# Patient Record
(demographics unavailable — no encounter records)

---

## 2024-10-23 NOTE — PHYSICAL EXAM
[Clear Rhinorrhea] : clear rhinorrhea [Rhonchi] : rhonchi [NL] : warm, clear [Tachypnea] : no tachypnea [Belly Breathing] : no belly breathing

## 2024-10-23 NOTE — HISTORY OF PRESENT ILLNESS
[de-identified] : CHILD POSITIVE FOR RSV, WAS SEEN IN ED 2 DAYS AGO FOR FEVER AND DIFFICULTY BREATHING, DISCHARGED AFTER SUCTIONING, HERE FOR REEVALUATION

## 2024-10-26 NOTE — PHYSICAL EXAM
[NL] : warm, clear [Playful] : playful [FreeTextEntry7] : FEW SCATTERED WHEEZES AND RHONCHI, GOOD AIR ENTRY, NO DISTRESS

## 2024-11-12 NOTE — PHYSICAL EXAM
[Alert] : alert [Acute Distress] : no acute distress [Normocephalic] : normocephalic [Flat Open Anterior [place]] : flat open anterior fontanelle [Red Reflex] : red reflex bilateral [PERRL] : PERRL [Normally Placed Ears] : normally placed ears [Auricles Well Formed] : auricles well formed [Clear Tympanic membranes] : clear tympanic membranes [Light reflex present] : light reflex present [Bony landmarks visible] : bony landmarks visible [Discharge] : no discharge [Nares Patent] : nares patent [Palate Intact] : palate intact [Uvula Midline] : uvula midline [Tooth Eruption] : no tooth eruption [Supple, full passive range of motion] : supple, full passive range of motion [Palpable Masses] : no palpable masses [Symmetric Chest Rise] : symmetric chest rise [Clear to Auscultation Bilaterally] : clear to auscultation bilaterally [Regular Rate and Rhythm] : regular rate and rhythm [S1, S2 present] : S1, S2 present [Murmurs] : no murmurs [+2 Femoral Pulses] : (+) 2 femoral pulses [Soft] : soft [Tender] : nontender [Distended] : nondistended [Bowel Sounds] : bowel sounds present [Hepatomegaly] : no hepatomegaly [Splenomegaly] : no splenomegaly [Normal External Genitalia] : normal external genitalia [Clitoromegaly] : no clitoromegaly [Normal Vaginal Introitus] : normal vaginal introitus [Patent] : patent [Normally Placed] : normally placed [No Abnormal Lymph Nodes Palpated] : no abnormal lymph nodes palpated [Curry-Ortolani] : negative Curry-Ortolani [Allis Sign] : negative Allis sign [Symmetric Buttocks Creases] : symmetric buttocks creases [Spinal Dimple] : no spinal dimple [Tuft of Hair] : no tuft of hair [Plantar Grasp] : plantar grasp reflex present [Cranial Nerves Grossly Intact] : cranial nerves grossly intact [Rash or Lesions] : no rash/lesions

## 2024-11-12 NOTE — HISTORY OF PRESENT ILLNESS
[Mother] : mother [Breast milk] : breast milk [Well-balanced] : well-balanced [Normal] : Normal [No] : No cigarette smoke exposure [Water heater temperature set at <120 degrees F] : Water heater temperature set at <120 degrees F [Rear facing car seat in back seat] : Rear facing car seat in back seat [Carbon Monoxide Detectors] : Carbon monoxide detectors at home [Smoke Detectors] : Smoke detectors at home. [de-identified] : HOME [NO] : No

## 2025-01-06 NOTE — HISTORY OF PRESENT ILLNESS
[de-identified] : COUGH, WHEEZING AND BUMPS ON FACE AFTER EATING PEANUT BUTTER, MOM IS CONCERNED ABOUT POSSIBLE ALLERGY

## 2025-01-06 NOTE — HISTORY OF PRESENT ILLNESS
[de-identified] : COUGH, WHEEZING AND BUMPS ON FACE AFTER EATING PEANUT BUTTER, MOM IS CONCERNED ABOUT POSSIBLE ALLERGY

## 2025-02-03 NOTE — HISTORY OF PRESENT ILLNESS
[de-identified] : COUGH AND RATTLING IN CHEST  [FreeTextEntry6] : runny nose afebrile barky cough at night, difficulty sleeping sib w/same sxs + post-tussive vomiting

## 2025-02-03 NOTE — PLAN
[TextEntry] : croup guidelines (cold outside air, humidifier, saline nebs, bathroom w/hot steam shower on) benadryl / honey prn ok to ff

## 2025-02-25 NOTE — PHYSICAL EXAM
[Alert] : alert [Normocephalic] : normocephalic [Flat Open Anterior [place]] : flat open anterior fontanelle [Red Reflex] : red reflex bilateral [PERRL] : PERRL [Normally Placed Ears] : normally placed ears [Auricles Well Formed] : auricles well formed [Clear Tympanic membranes] : clear tympanic membranes [Light reflex present] : light reflex present [Bony landmarks visible] : bony landmarks visible [Nares Patent] : nares patent [Palate Intact] : palate intact [Uvula Midline] : uvula midline [Supple, full passive range of motion] : supple, full passive range of motion [Symmetric Chest Rise] : symmetric chest rise [Clear to Auscultation Bilaterally] : clear to auscultation bilaterally [Regular Rate and Rhythm] : regular rate and rhythm [S1, S2 present] : S1, S2 present [+2 Femoral Pulses] : (+) 2 femoral pulses [Soft] : soft [Bowel Sounds] : bowel sounds present [Normal External Genitalia] : normal external genitalia [Normal Vaginal Introitus] : normal vaginal introitus [No Abnormal Lymph Nodes Palpated] : no abnormal lymph nodes palpated [Symmetric abduction and rotation of hips] : symmetric abduction and rotation of hips [Straight] : straight [Cranial Nerves Grossly Intact] : cranial nerves grossly intact [Rash or Lesions] : rash and/or lesion present [Acute Distress] : no acute distress [Excessive Tearing] : no excessive tearing [Discharge] : no discharge [Palpable Masses] : no palpable masses [Murmurs] : no murmurs [Tender] : nontender [Distended] : nondistended [Hepatomegaly] : no hepatomegaly [Splenomegaly] : no splenomegaly [Clitoromegaly] : no clitoromegaly [Allis Sign] : negative Allis sign [de-identified] : MONILIAL DIAPER DERMATITIS

## 2025-02-25 NOTE — DEVELOPMENTAL MILESTONES
[Yes] : Completed. [Normal Development] : Normal Development [None] : none [Uses basic gestures] : uses basic gestures [Says "Zeeshan" or "Mama"] : says "Zeeshan" or "Mama" nonspecifically [Sits well without support] : sits well without support [Transitions between sitting and lying] : transitions between sitting and lying [Balances on hands and knees] : balances on hands and knees [Crawls] : crawls [Picks up small objects with 3 fingers] : picks up small objects with 3 fingers and thumb [Releases objects intentionally] : releases objects intentionally [[place] objects together] : bangs objects together

## 2025-02-25 NOTE — HISTORY OF PRESENT ILLNESS
[Parents] : parents [Tap water] : Primary Fluoride Source: Tap water [No] : Not at  exposure [Carbon Monoxide Detectors] : Carbon monoxide detectors [Smoke Detectors] : Smoke detectors [de-identified] : home

## 2025-02-25 NOTE — PHYSICAL EXAM
[Alert] : alert [Normocephalic] : normocephalic [Flat Open Anterior [place]] : flat open anterior fontanelle [Red Reflex] : red reflex bilateral [PERRL] : PERRL [Normally Placed Ears] : normally placed ears [Auricles Well Formed] : auricles well formed [Clear Tympanic membranes] : clear tympanic membranes [Light reflex present] : light reflex present [Bony landmarks visible] : bony landmarks visible [Nares Patent] : nares patent [Palate Intact] : palate intact [Uvula Midline] : uvula midline [Supple, full passive range of motion] : supple, full passive range of motion [Symmetric Chest Rise] : symmetric chest rise [Clear to Auscultation Bilaterally] : clear to auscultation bilaterally [Regular Rate and Rhythm] : regular rate and rhythm [S1, S2 present] : S1, S2 present [+2 Femoral Pulses] : (+) 2 femoral pulses [Soft] : soft [Bowel Sounds] : bowel sounds present [Normal External Genitalia] : normal external genitalia [Normal Vaginal Introitus] : normal vaginal introitus [No Abnormal Lymph Nodes Palpated] : no abnormal lymph nodes palpated [Symmetric abduction and rotation of hips] : symmetric abduction and rotation of hips [Straight] : straight [Cranial Nerves Grossly Intact] : cranial nerves grossly intact [Rash or Lesions] : rash and/or lesion present [Acute Distress] : no acute distress [Excessive Tearing] : no excessive tearing [Discharge] : no discharge [Palpable Masses] : no palpable masses [Murmurs] : no murmurs [Tender] : nontender [Distended] : nondistended [Hepatomegaly] : no hepatomegaly [Splenomegaly] : no splenomegaly [Clitoromegaly] : no clitoromegaly [Allis Sign] : negative Allis sign [de-identified] : MONILIAL DIAPER DERMATITIS

## 2025-02-25 NOTE — DISCUSSION/SUMMARY
[Normal Growth] : growth [Normal Development] : development [None] : No known medical problems [No Elimination Concerns] : elimination [No Feeding Concerns] : feeding [No Skin Concerns] : skin [Normal Sleep Pattern] : sleep [Family Adaptation] : family adaptation [Infant [place]] : infant independence [Feeding Routine] : feeding routine [Safety] : safety [No Medications] : ~He/She~ is not on any medications [Parent/Guardian] : parent/guardian [] : The components of the vaccine(s) to be administered today are listed in the plan of care. The disease(s) for which the vaccine(s) are intended to prevent and the risks have been discussed with the caretaker.  The risks are also included in the appropriate vaccination information statements which have been provided to the patient's caregiver.  The caregiver has given consent to vaccinate.

## 2025-02-25 NOTE — DISCUSSION/SUMMARY
[Normal Growth] : growth [Normal Development] : development [None] : No known medical problems [No Elimination Concerns] : elimination [No Feeding Concerns] : feeding [No Skin Concerns] : skin [Normal Sleep Pattern] : sleep [Family Adaptation] : family adaptation [Infant [place]] : infant independence [Feeding Routine] : feeding routine [Safety] : safety [No Medications] : ~He/She~ is not on any medications [Parent/Guardian] : parent/guardian [] : The components of the vaccine(s) to be administered today are listed in the plan of care. The disease(s) for which the vaccine(s) are intended to prevent and the risks have been discussed with the caretaker.  The risks are also included in the appropriate vaccination information statements which have been provided to the patient's caregiver.  The caregiver has given consent to vaccinate.

## 2025-02-25 NOTE — HISTORY OF PRESENT ILLNESS
[Parents] : parents [Tap water] : Primary Fluoride Source: Tap water [No] : Not at  exposure [Carbon Monoxide Detectors] : Carbon monoxide detectors [Smoke Detectors] : Smoke detectors [de-identified] : home

## 2025-02-25 NOTE — DEVELOPMENTAL MILESTONES
[Yes] : Completed. [Normal Development] : Normal Development [None] : none [Uses basic gestures] : uses basic gestures [Says "Zeeshan" or "Mama"] : says "Zeeshan" or "Mama" nonspecifically [Sits well without support] : sits well without support [Transitions between sitting and lying] : transitions between sitting and lying [Balances on hands and knees] : balances on hands and knees [Crawls] : crawls [Picks up small objects with 3 fingers] : picks up small objects with 3 fingers and thumb [Releases objects intentionally] : releases objects intentionally [[place] objects together] : bangs objects together

## 2025-05-12 NOTE — DISCUSSION/SUMMARY
[Family Support] : family support [Establishing Routines] : establishing routines [Feeding and Appetite Changes] : feeding and appetite changes [Establishing A Dental Home] : establishing a dental home [Safety] : safety [Mother] : mother [] : The components of the vaccine(s) to be administered today are listed in the plan of care. The disease(s) for which the vaccine(s) are intended to prevent and the risks have been discussed with the caretaker.  The risks are also included in the appropriate vaccination information statements which have been provided to the patient's caregiver.  The caregiver has given consent to vaccinate. [FreeTextEntry1] :  12 month old F presenting for a WCC. Vitals and PE wnl. Lead negative and Hgb wnl. Appropriate weight gain and development for age. Amblyopia screen no risk factors. Will monitor height closely.   Plan: - Transition to whole cow's milk - Continue table foods, 3 meals with 2-3 snacks per day - Incorporate up to 6 oz of fluorinated water daily in a Sippy cup - Brush teeth twice a day with soft toothbrush. Recommend visit to dentist - When in car, keep child in rear-facing car seats until age 2, or until the maximum height and weight for seat is reached - Infant should sleep in own space. Strive to maintain consistent daily routines and sleep schedule - Ensure home is safe since baby is increasingly mobile - Avoid screen time - Read aloud to infant - Return in 3 months for 15 month WCC - MMR and Varicella today

## 2025-05-12 NOTE — PHYSICAL EXAM
[Alert] : alert [Playful] : playful [Normocephalic] : normocephalic [Closed Anterior [place]] : closed anterior fontanelle [Red Reflex] : red reflex bilateral [PERRL] : PERRL [Normally Placed Ears] : normally placed ears [Auricles Well Formed] : auricles well formed [Clear Tympanic membranes] : clear tympanic membranes [Light reflex present] : light reflex present [Bony landmarks visible] : bony landmarks visible [Nares Patent] : nares patent [Palate Intact] : palate intact [Uvula Midline] : uvula midline [Tooth Eruption] : tooth eruption [Supple, full passive range of motion] : supple, full passive range of motion [Symmetric Chest Rise] : symmetric chest rise [Clear to Auscultation Bilaterally] : clear to auscultation bilaterally [Regular Rate and Rhythm] : regular rate and rhythm [S1, S2 present] : S1, S2 present [+2 Femoral Pulses] : (+) 2 femoral pulses [Soft] : soft [Bowel Sounds] : normoactive bowel sounds [Normal External Genitalia] : normal external genitalia [Normal Vaginal Introitus] : normal vaginal introitus [No Abnormal Lymph Nodes Palpated] : no abnormal lymph nodes palpated [Symmetric Abduction and Rotation of Hips] : symmetric abduction and rotation of hips [Straight] : straight [Cranial Nerves Grossly Intact] : cranial nerves grossly intact [Discharge] : no discharge [Palpable Masses] : no palpable masses [Murmurs] : no murmurs [Tender] : nontender [Distended] : nondistended [Hepatomegaly] : no hepatomegaly [Splenomegaly] : no splenomegaly [Clitoromegaly] : no clitoromegaly [Allis Sign] : negative Allis sign [de-identified] : Dry rash on back

## 2025-05-12 NOTE — DEVELOPMENTAL MILESTONES
[Normal Development] : Normal Development [Yes: _______] : yes, [unfilled] [Looks for hidden objects] : looks for hidden objects [Imitates new gestures] : imitates new gestures [Says "Dad" or "Mom" with meaning] : says "Dad" or "Mom" with meaning [Uses one word other than Mom or] : uses one word other than Mom or Dad or personal names [Follows a verbal command that] : follows a verbal command that includes a gesture [Takes first independent] : takes first independent steps [Stands without support] : stands without support [Drops object in a cup] : drops object in a cup [Picks up small object with 2 finger] : picks up small object with 2 finger pincer grasp [Picks up food and eats it] : picks up food and eats it [FreeTextEntry1] : Says "maua, tiffaniea, mullins mullins (for sister Swetha), hi". Waves and claps.

## 2025-05-12 NOTE — HISTORY OF PRESENT ILLNESS
[Mother] : mother [Breast milk] : breast milk [Fruit] : fruit [Vegetables] : vegetables [Meat] : meat [Dairy] : dairy [Finger food] : finger food [Table food] : table food [___ stools per day] : [unfilled]  stools per day [Normal] : Normal [Wakes up at night] : Wakes up at night [Tap water] : Primary Fluoride Source: Tap water [Playtime] : Playtime  [No] : No cigarette smoke exposure [Car seat in back seat] : Car seat in back seat [Smoke Detectors] : Smoke detectors [Carbon Monoxide Detectors] : Carbon monoxide detectors [Up to date] : Up to date [NO] : No [Exposure to electronic nicotine delivery system] : No exposure to electronic nicotine delivery system [de-identified] : Good eater, interested in everything. Has tried whole milk and yogurt, still nursing at night. [FreeTextEntry9] : HOME

## 2025-05-19 NOTE — DISCUSSION/SUMMARY
[FreeTextEntry1] : 12m F w/ presentation consistent with acute URI.  Plan: 1. Supportive care with Tylenol/Motrin PRN, increased fluids, keeping head elevated and rest  2. Monitor and return with any new or worsening symptoms.

## 2025-05-19 NOTE — HISTORY OF PRESENT ILLNESS
[de-identified] : COUGHING [FreeTextEntry6] : 12m F complaining of cough and congestion since last night. Denies any fever, SOB or change in appetite

## 2025-05-19 NOTE — REVIEW OF SYSTEMS
[Cough] : cough [Congestion] : congestion [Negative] : Genitourinary [Fever] : no fever [Appetite Changes] : no appetite changes

## 2025-05-21 NOTE — DISCUSSION/SUMMARY
[FreeTextEntry1] : 12m F w/ presentation consistent with acute URI  Plan: 1. Supportive care with Tylenol/Motrin PRN, increased fluids, keeping head elevated and rest  2. Monitor and return with any new or worsening symptoms.

## 2025-05-21 NOTE — HISTORY OF PRESENT ILLNESS
[de-identified] : COUGHING AND FEVER [FreeTextEntry6] : 12m F present with cough and congestion x2.5 days and fever since last night. Tmax of 101 F. Tolerating fluids and eating with decreased appetite. Denies any difficulty breathing.

## 2025-06-23 NOTE — REVIEW OF SYSTEMS
[Fever] : fever [Fussy] : fussy [Appetite Changes] : appetite changes [Negative] : Genitourinary [Cough] : no cough [Congestion] : no congestion [Vomiting] : no vomiting

## 2025-06-23 NOTE — DISCUSSION/SUMMARY
[FreeTextEntry1] : 13m F w/ fever since yesterday.   Plan: 1. Respiratory pathogen panel 2. If fever does not resolve within the next 24-48 hours then return with urine (bag given to mother) 3. Supportive care with Tylenol/Motrin PRN, increased fluids and rest  4. Monitor and return with any new or worsening symptoms. 5. Discussed possibility of [place] and what to expect

## 2025-06-23 NOTE — HISTORY OF PRESENT ILLNESS
[de-identified] : high fever, watery green stool, physical discomfort, sleeping trouble [FreeTextEntry6] : 13m F present with fever since yesterday. Tmax of 103 F. Notes fussiness. Stool appears looser than usual and green. Denies any cough, congestion, vomiting or rashes. Tolerating fluids and eating with decreased appetite.

## 2025-06-28 NOTE — REVIEW OF SYSTEMS
[Abdominal Pain] : abdominal pain [Negative] : Genitourinary [Fever] : no fever [Diarrhea] : no diarrhea [Constipation] : no constipation

## 2025-06-28 NOTE — DISCUSSION/SUMMARY
[FreeTextEntry1] : CHILD MUCH IMPROVED, EATING IMPROVED,  HOWEVER DROPPING PERCENTILES BOTH GROWTH AND WEIGHT, LOST 4 OUNCES PAST 4 DAYS

## 2025-06-28 NOTE — HISTORY OF PRESENT ILLNESS
[de-identified] : HFU FOR FEVER AND BODY ACHES, MOM CONCERNED ABOUT HER WEIGHT. CHILD WAS IN ED FOR FEVER ABDOMINAL PAIN, US NEGATIVE FOR INTUSSUSCEPTION , ADENO POSITIVE RVP. CHILD NOW AFEBRILE, EATING  IMPROVED,STILL SCREAMS AT NIGHT TIME

## 2025-07-02 NOTE — REVIEW OF SYSTEMS
[Fever] : fever [Fatigue] : fatigue [Pallor] : ~T no ~M pallor [Redness] : no redness [Discharge] : no discharge [Joint Swelling] : no joint swelling

## 2025-07-02 NOTE — HISTORY OF PRESENT ILLNESS
[de-identified] : Raine is a 13mo. F with a PMHx of recurrent viral illnesses who presents today for concerns of poor weight gain.  Positive for adenovirus in the ED in mid-June 2025. Presented with fevers (100-102), inconsolable, and screaming. Treated at home with Pedialyte and supportive measures. Less interested in food during this time but continued to eat and keep up with liquid intake. Had a few instances of diarrhea during this time.  Now returning to baseline. Providers in ED recommended seeing GI for concerns of failure to thrive.  Transitioned to solids at 6mo. [place] fed until 13mo. Now transitioned to cow's milk, will take between 8-12oz at night. Has a very varied diet including broccoli, zucchini, protein pasta, hummus, eggplants swordfish, fruits, beef, chicken, lamb, fish. Eats table foods parents are eating. Always interested in food. No coughing, choking, or gaging. No vomiting or spit ups.   Has 2-3 BM per day, [place] 4. No mucus or blood in stool. No discomfort or pain while passing stool.   .   Post-viral gastroenteritis

## 2025-07-02 NOTE — FAMILY HISTORY
[Inflammatory Bowel Disease] : no inflammatory bowel disease [Celiac Disease] : no celiac disease [Constipation] : no constipation

## 2025-07-02 NOTE — PHYSICAL EXAM
[NAD] : in no acute distress [Short For Stated Age] : short for stated age [PERRL] : pupils were equal, round, reactive to light  [EOMI] : ~T the extraocular movements were normal and intact [No Palpable Thyroid] : no palpable thyroid [CTAB] : lungs clear to auscultation bilaterally [Regular Rate and Rhythm] : regular rate and rhythm [Normal S1, S2] : normal S1 and S2 [Soft] : soft  [Normal Tone] : normal tone [Well-Perfused] : well-perfused [Appropriate Affect] : appropriate affect [Distended] : non distended [Rebound] : no rebound tenderness [Guarding] : no guarding [de-identified] : small red lesion on left labia majora

## 2025-07-02 NOTE — ASSESSMENT
[FreeTextEntry1] : Raine is a 13mo. F with a PMHx of recurrent viral illnesses who presents today for concerns of poor weight gain.   Plan: -Discussed with parents Raine's growth chart- has been in 10th percentile and below for the majority of lifetime until recently. -Drop to 4th percentile coincides with recurrent viral illnesses, most recently adenovirus. -Reassuring that Raine has great interest in food and eats well. No vomiting, frequent vomiting or diarrhea.  -Encouraged parents to increase caloric density in food in coming months. -Advised to call office to update weight after seeing pediatrician at 15mo visit. -RTO in 3 months.

## 2025-07-10 NOTE — HISTORY OF PRESENT ILLNESS
[de-identified] : Fever and coughing. [FreeTextEntry6] : Tmax = 102F wet cough w/some runny nose no vomiting but spitting up phlegm was barky, went to [facility] [place] and dx'd w/croup loosened on dex  s/p [facility] prior week, dx'd w/adeno after covid/flu RATs neg in office resolved after 1 wk supportive care 2d afebrile before current episode older sib goes to school, doing ok